# Patient Record
Sex: FEMALE | Race: WHITE | NOT HISPANIC OR LATINO | URBAN - METROPOLITAN AREA
[De-identification: names, ages, dates, MRNs, and addresses within clinical notes are randomized per-mention and may not be internally consistent; named-entity substitution may affect disease eponyms.]

---

## 2017-03-27 ENCOUNTER — IMPORTED ENCOUNTER (OUTPATIENT)
Dept: URBAN - METROPOLITAN AREA CLINIC 31 | Facility: CLINIC | Age: 62
End: 2017-03-27

## 2017-03-27 PROBLEM — H16.223: Noted: 2017-03-27

## 2017-03-27 PROBLEM — H17.89: Noted: 2017-03-27

## 2017-03-27 PROCEDURE — 92015 DETERMINE REFRACTIVE STATE: CPT

## 2017-03-27 PROCEDURE — 92014 COMPRE OPH EXAM EST PT 1/>: CPT

## 2017-03-27 NOTE — PATIENT DISCUSSION
1.  S/P  Lasik:  2000 Dist ou. 2.  Dry Eye OU:  Continue current management with Restasis bid and increase tears liquidgel to qid. Shamika [place] but will wait for now. Pt has Bacitracin leighton.  3.  Ocular rosacea- disc doxy but will hold off on it. . 4. MARLENE-   Epithelioma nasally- stable 5.   RTN 3/18  CE

## 2017-10-27 ENCOUNTER — APPOINTMENT (RX ONLY)
Dept: URBAN - METROPOLITAN AREA CLINIC 126 | Facility: CLINIC | Age: 62
Setting detail: DERMATOLOGY
End: 2017-10-27

## 2017-10-27 DIAGNOSIS — L81.4 OTHER MELANIN HYPERPIGMENTATION: ICD-10-CM

## 2017-10-27 DIAGNOSIS — Z71.89 OTHER SPECIFIED COUNSELING: ICD-10-CM

## 2017-10-27 DIAGNOSIS — L21.8 OTHER SEBORRHEIC DERMATITIS: ICD-10-CM

## 2017-10-27 DIAGNOSIS — L57.0 ACTINIC KERATOSIS: ICD-10-CM

## 2017-10-27 PROCEDURE — ? COUNSELING

## 2017-10-27 PROCEDURE — 17000 DESTRUCT PREMALG LESION: CPT

## 2017-10-27 PROCEDURE — ? TREATMENT REGIMEN

## 2017-10-27 PROCEDURE — ? LIQUID NITROGEN

## 2017-10-27 PROCEDURE — 99214 OFFICE O/P EST MOD 30 MIN: CPT | Mod: 25

## 2017-10-27 ASSESSMENT — LOCATION ZONE DERM
LOCATION ZONE: ARM
LOCATION ZONE: SCALP
LOCATION ZONE: TRUNK

## 2017-10-27 ASSESSMENT — LOCATION SIMPLE DESCRIPTION DERM
LOCATION SIMPLE: CHEST
LOCATION SIMPLE: UPPER BACK
LOCATION SIMPLE: LEFT FOREARM
LOCATION SIMPLE: HAIR
LOCATION SIMPLE: RIGHT FOREARM

## 2017-10-27 ASSESSMENT — LOCATION DETAILED DESCRIPTION DERM
LOCATION DETAILED: HAIR
LOCATION DETAILED: RIGHT DISTAL DORSAL FOREARM
LOCATION DETAILED: LOWER STERNUM
LOCATION DETAILED: LEFT PROXIMAL RADIAL DORSAL FOREARM
LOCATION DETAILED: RIGHT PROXIMAL DORSAL FOREARM
LOCATION DETAILED: INFERIOR THORACIC SPINE

## 2017-10-27 NOTE — PROCEDURE: LIQUID NITROGEN
Consent: The patient's consent was obtained including but not limited to risks of crusting, scabbing, blistering, scarring, darker or lighter pigmentary change, recurrence, incomplete removal and infection.
Post-Care Instructions: I reviewed with the patient in detail post-care instructions. Patient is to wear sunprotection, and avoid picking at any of the treated lesions. Pt may apply Vaseline to crusted or scabbing areas.
Render Post-Care Instructions In Note?: no
Detail Level: Simple
Duration Of Freeze Thaw-Cycle (Seconds): 0

## 2017-10-27 NOTE — PROCEDURE: MIPS QUALITY
Quality 47: Advance Care Plan: Advance Care Planning discussed and documented in the medical record; patient did not wish or was not able to name a surrogate decision maker or provide an advance care plan.
Quality 131: Pain Assessment And Follow-Up: Pain assessment using a standardized tool is documented as negative, no follow-up plan required
Quality 130: Documentation Of Current Medications In The Medical Record: Current Medications Documented
Quality 111:Pneumonia Vaccination Status For Older Adults: Pneumococcal Vaccination Previously Received
Quality 110: Preventive Care And Screening: Influenza Immunization: Influenza Immunization previously received during influenza season
Quality 226: Preventive Care And Screening: Tobacco Use: Screening And Cessation Intervention: Patient screened for tobacco and never smoked
Detail Level: Detailed

## 2018-03-19 ENCOUNTER — IMPORTED ENCOUNTER (OUTPATIENT)
Dept: URBAN - METROPOLITAN AREA CLINIC 31 | Facility: CLINIC | Age: 63
End: 2018-03-19

## 2018-03-19 PROBLEM — H16.223: Noted: 2018-03-19

## 2018-03-19 PROBLEM — H17.89: Noted: 2018-03-19

## 2018-03-19 PROCEDURE — 92014 COMPRE OPH EXAM EST PT 1/>: CPT

## 2018-03-19 PROCEDURE — 92015 DETERMINE REFRACTIVE STATE: CPT

## 2018-03-19 NOTE — PATIENT DISCUSSION
1.  S/P  Lasik:  2000 Dist ou. 2.  Dry Eye OU:  Continue current management with Restasis bid and increase tears liquidgel to qid. Jessica [place] but will wait for now. Pt has Bacitracin leighton. Use FML 1x per month. 3.  Ocular rosacea- disc doxy but will hold off on it. . 4. MARLENE-   Epithelioma nasally- stable 5. Disc rx for dist-  will wait for now but pt did not do well on chart today. Will increase her OTC to 1.756.   RTN 3/19  CE

## 2019-03-20 ENCOUNTER — IMPORTED ENCOUNTER (OUTPATIENT)
Dept: URBAN - METROPOLITAN AREA CLINIC 31 | Facility: CLINIC | Age: 64
End: 2019-03-20

## 2019-03-20 PROBLEM — H16.223: Noted: 2019-03-20

## 2019-03-20 PROBLEM — H17.89: Noted: 2019-03-20

## 2019-03-20 PROCEDURE — 92014 COMPRE OPH EXAM EST PT 1/>: CPT

## 2019-03-20 PROCEDURE — 92015 DETERMINE REFRACTIVE STATE: CPT

## 2019-03-20 NOTE — PATIENT DISCUSSION
1.  S/P  Lasik:  2000 Dist ou. 2.  Dry Eye OU:  Continue Restasis bid and increase tears liquidgel to qid. Jessica [place] but will wait for now. Pt has Bacitracin leighton. Use FML 1x per month. 3.  Ocular rosacea- disc doxy but will hold off on it. . 4. MARLENE-   Epithelioma nasally- stable 5. Disc rx for dist-  Put in suns. Will increase her OTC to 2.006.   RTN 3/20  CE

## 2019-05-24 ENCOUNTER — IMPORTED ENCOUNTER (OUTPATIENT)
Dept: URBAN - METROPOLITAN AREA CLINIC 31 | Facility: CLINIC | Age: 64
End: 2019-05-24

## 2019-05-24 PROBLEM — H16.223: Noted: 2019-05-24

## 2019-05-24 PROBLEM — H10.502: Noted: 2019-05-24

## 2019-05-24 PROBLEM — H17.89: Noted: 2019-05-24

## 2019-05-24 PROCEDURE — 99214 OFFICE O/P EST MOD 30 MIN: CPT

## 2019-05-24 NOTE — PATIENT DISCUSSION
Blepharitis posterior type - The patient exhibits inspissated meibomian glands. Warm compresses lid massage and lid scrubs were recommended.  Antibiotic leighton oral Doxycycline 50mg qd omega three/flaxseed oil supplements

## 2019-05-24 NOTE — PATIENT DISCUSSION
1.  S/P  Lasik:  2000 Dist ou. 2.  Dry Eye OU:  Continue Restasis bid and increase tears liquid gel to qid. Rx LOtemax tid and wash lids bid. Cont Bacitracin leighton qhs. Uses FML 1x per month. 3.  Ocular rosacea- disc doxy but will hold off on it. . 4. MARLENE-   Epithelioma nasally- stable 5. Disc rx for dist-  Put in suns. Will increase her OTC to 2.006. RTN  10 days OC7.   RTN 3/20  CE

## 2019-06-07 ENCOUNTER — IMPORTED ENCOUNTER (OUTPATIENT)
Dept: URBAN - METROPOLITAN AREA CLINIC 31 | Facility: CLINIC | Age: 64
End: 2019-06-07

## 2019-06-07 PROBLEM — H16.223: Noted: 2019-06-07

## 2019-06-07 PROBLEM — H17.89: Noted: 2019-06-07

## 2019-06-07 PROCEDURE — 99213 OFFICE O/P EST LOW 20 MIN: CPT

## 2019-06-07 NOTE — PATIENT DISCUSSION
1.  S/P  Lasik:  2000 Dist ou. 2.  Dry Eye OU:  Continue Restasis bid and increase tears liquid gel to qid. Finish Lotemax qd and wash lids bid. Cont Bacitracin leighton qhs. Uses FML 1x per month. 3.  Ocular rosacea- disc doxy but will hold off on it. . 4. MARLENE-   Epithelioma nasally- stable 5. Disc rx for dist-  Put in suns. Will increase her OTC to 2.006.    RTN 3/20  CE

## 2019-08-30 ENCOUNTER — APPOINTMENT (RX ONLY)
Dept: URBAN - METROPOLITAN AREA CLINIC 126 | Facility: CLINIC | Age: 64
Setting detail: DERMATOLOGY
End: 2019-08-30

## 2019-08-30 DIAGNOSIS — L81.4 OTHER MELANIN HYPERPIGMENTATION: ICD-10-CM

## 2019-08-30 DIAGNOSIS — D22 MELANOCYTIC NEVI: ICD-10-CM

## 2019-08-30 DIAGNOSIS — L81.5 LEUKODERMA, NOT ELSEWHERE CLASSIFIED: ICD-10-CM

## 2019-08-30 DIAGNOSIS — D69.2 OTHER NONTHROMBOCYTOPENIC PURPURA: ICD-10-CM

## 2019-08-30 DIAGNOSIS — D18.0 HEMANGIOMA: ICD-10-CM

## 2019-08-30 DIAGNOSIS — Z71.89 OTHER SPECIFIED COUNSELING: ICD-10-CM

## 2019-08-30 PROBLEM — D22.5 MELANOCYTIC NEVI OF TRUNK: Status: ACTIVE | Noted: 2019-08-30

## 2019-08-30 PROBLEM — D18.01 HEMANGIOMA OF SKIN AND SUBCUTANEOUS TISSUE: Status: ACTIVE | Noted: 2019-08-30

## 2019-08-30 PROCEDURE — ? TREATMENT REGIMEN

## 2019-08-30 PROCEDURE — 99214 OFFICE O/P EST MOD 30 MIN: CPT

## 2019-08-30 PROCEDURE — ? COUNSELING

## 2019-08-30 ASSESSMENT — LOCATION ZONE DERM
LOCATION ZONE: LEG
LOCATION ZONE: ARM
LOCATION ZONE: TRUNK

## 2019-08-30 ASSESSMENT — LOCATION DETAILED DESCRIPTION DERM
LOCATION DETAILED: EPIGASTRIC SKIN
LOCATION DETAILED: INFERIOR THORACIC SPINE
LOCATION DETAILED: LOWER STERNUM
LOCATION DETAILED: RIGHT MEDIAL UPPER BACK
LOCATION DETAILED: RIGHT PROXIMAL PRETIBIAL REGION
LOCATION DETAILED: LEFT PROXIMAL RADIAL DORSAL FOREARM
LOCATION DETAILED: RIGHT PROXIMAL DORSAL FOREARM
LOCATION DETAILED: SUBXIPHOID
LOCATION DETAILED: LEFT PROXIMAL PRETIBIAL REGION
LOCATION DETAILED: LEFT PROXIMAL DORSAL FOREARM

## 2019-08-30 ASSESSMENT — LOCATION SIMPLE DESCRIPTION DERM
LOCATION SIMPLE: LEFT PRETIBIAL REGION
LOCATION SIMPLE: LEFT FOREARM
LOCATION SIMPLE: UPPER BACK
LOCATION SIMPLE: RIGHT UPPER BACK
LOCATION SIMPLE: CHEST
LOCATION SIMPLE: RIGHT FOREARM
LOCATION SIMPLE: ABDOMEN
LOCATION SIMPLE: RIGHT PRETIBIAL REGION

## 2019-08-30 NOTE — PROCEDURE: MIPS QUALITY
Detail Level: Simple
Additional Notes: Pain 0/10, shingrix denied
Quality 131: Pain Assessment And Follow-Up: Pain assessment using a standardized tool is documented as negative, no follow-up plan required
Quality 130: Documentation Of Current Medications In The Medical Record: Current Medications Documented

## 2019-10-08 ENCOUNTER — APPOINTMENT (RX ONLY)
Dept: URBAN - METROPOLITAN AREA CLINIC 126 | Facility: CLINIC | Age: 64
Setting detail: DERMATOLOGY
End: 2019-10-08

## 2019-10-08 DIAGNOSIS — L0391 CELLULITIS AND ABSCESS OF UNSPECIFIED SITES: ICD-10-CM

## 2019-10-08 DIAGNOSIS — L0390 CELLULITIS AND ABSCESS OF UNSPECIFIED SITES: ICD-10-CM

## 2019-10-08 PROBLEM — L02.01 CUTANEOUS ABSCESS OF FACE: Status: ACTIVE | Noted: 2019-10-08

## 2019-10-08 PROCEDURE — 99213 OFFICE O/P EST LOW 20 MIN: CPT

## 2019-10-08 PROCEDURE — ? ORDER TESTS

## 2019-10-08 PROCEDURE — ? PRESCRIPTION

## 2019-10-08 RX ORDER — KETOCONAZOLE 20 MG/G
CREAM TOPICAL
Qty: 1 | Refills: 2 | Status: ERX

## 2019-10-08 ASSESSMENT — LOCATION SIMPLE DESCRIPTION DERM: LOCATION SIMPLE: LEFT LIP

## 2019-10-08 ASSESSMENT — LOCATION ZONE DERM: LOCATION ZONE: LIP

## 2019-10-08 ASSESSMENT — LOCATION DETAILED DESCRIPTION DERM: LOCATION DETAILED: LEFT ORAL COMMISSURE

## 2019-10-08 NOTE — PROCEDURE: ORDER TESTS
Performing Laboratory: [id number]
Bill For Surgical Tray: no
Billing Type: [employer]
Expected Date Of Service: 10/08/2019

## 2020-08-18 ENCOUNTER — IMPORTED ENCOUNTER (OUTPATIENT)
Dept: URBAN - METROPOLITAN AREA CLINIC 31 | Facility: CLINIC | Age: 65
End: 2020-08-18

## 2020-08-18 PROBLEM — H16.223: Noted: 2020-08-18

## 2020-08-18 PROBLEM — H25.13: Noted: 2020-08-18

## 2020-08-18 PROBLEM — H17.89: Noted: 2020-08-18

## 2020-08-18 PROCEDURE — 92014 COMPRE OPH EXAM EST PT 1/>: CPT

## 2020-08-18 PROCEDURE — 92015 DETERMINE REFRACTIVE STATE: CPT

## 2020-08-18 NOTE — PATIENT DISCUSSION
1. Early  Nuclear Sclerotic Cataract OU: Explained how cataracts can effect vision. Recommend clinical observation. The patient was advised to contact us if any change or worsening of vision. 2. S/P  Lasik:  2000 Dist ou. 2.  Dry Eye OU:  Continue Restasis bid and increase tears liquid gel to qid. wash lids bid. Cont Bacitracin leighton qhs. Uses FML 1x per month. 3.  Ocular rosacea- disc doxy but will hold off on it. . 4. MARLENE-   Epithelioma nasally- stable 5. Disc rx for dist-  Put in suns. Will increase her OTC to 2.006.    RTN  8/21  CE

## 2020-08-18 NOTE — PATIENT DISCUSSION
1.  S/P  Lasik:  2000 Dist ou. 2.  Dry Eye OU:  Continue Restasis bid and increase tears liquid gel to qid. wash lids bid. Cont Bacitracin leighton qhs. Uses FML 1x per month. 3.  Ocular rosacea- disc doxy but will hold off on it. . 4. MARLENE-   Epithelioma nasally- stable 5. Disc rx for dist-  Put in suns. Will increase her OTC to 2.006.    RTN  8/21  CE

## 2020-10-09 ENCOUNTER — APPOINTMENT (RX ONLY)
Dept: URBAN - METROPOLITAN AREA CLINIC 126 | Facility: CLINIC | Age: 65
Setting detail: DERMATOLOGY
End: 2020-10-09

## 2020-10-09 DIAGNOSIS — Z71.89 OTHER SPECIFIED COUNSELING: ICD-10-CM

## 2020-10-09 DIAGNOSIS — L81.4 OTHER MELANIN HYPERPIGMENTATION: ICD-10-CM

## 2020-10-09 DIAGNOSIS — L81.5 LEUKODERMA, NOT ELSEWHERE CLASSIFIED: ICD-10-CM

## 2020-10-09 DIAGNOSIS — L71.0 PERIORAL DERMATITIS: ICD-10-CM

## 2020-10-09 DIAGNOSIS — D22 MELANOCYTIC NEVI: ICD-10-CM

## 2020-10-09 DIAGNOSIS — L21.8 OTHER SEBORRHEIC DERMATITIS: ICD-10-CM

## 2020-10-09 PROBLEM — D22.5 MELANOCYTIC NEVI OF TRUNK: Status: ACTIVE | Noted: 2020-10-09

## 2020-10-09 PROBLEM — D48.5 NEOPLASM OF UNCERTAIN BEHAVIOR OF SKIN: Status: ACTIVE | Noted: 2020-10-09

## 2020-10-09 PROCEDURE — ? TREATMENT REGIMEN

## 2020-10-09 PROCEDURE — ? COUNSELING

## 2020-10-09 PROCEDURE — 99214 OFFICE O/P EST MOD 30 MIN: CPT

## 2020-10-09 ASSESSMENT — LOCATION DETAILED DESCRIPTION DERM
LOCATION DETAILED: POSTERIOR MID-PARIETAL SCALP
LOCATION DETAILED: RIGHT PROXIMAL DORSAL FOREARM
LOCATION DETAILED: RIGHT DISTAL DORSAL FOREARM
LOCATION DETAILED: LEFT PROXIMAL POSTERIOR UPPER ARM
LOCATION DETAILED: RIGHT PROXIMAL POSTERIOR UPPER ARM
LOCATION DETAILED: RIGHT PROXIMAL PRETIBIAL REGION
LOCATION DETAILED: PERIUMBILICAL SKIN
LOCATION DETAILED: LEFT PROXIMAL DORSAL FOREARM
LOCATION DETAILED: LEFT DISTAL PRETIBIAL REGION
LOCATION DETAILED: SUPERIOR THORACIC SPINE
LOCATION DETAILED: EPIGASTRIC SKIN
LOCATION DETAILED: RIGHT INFERIOR MEDIAL MALAR CHEEK
LOCATION DETAILED: LEFT INFERIOR MEDIAL MALAR CHEEK

## 2020-10-09 ASSESSMENT — LOCATION SIMPLE DESCRIPTION DERM
LOCATION SIMPLE: RIGHT POSTERIOR UPPER ARM
LOCATION SIMPLE: UPPER BACK
LOCATION SIMPLE: LEFT POSTERIOR UPPER ARM
LOCATION SIMPLE: LEFT PRETIBIAL REGION
LOCATION SIMPLE: RIGHT PRETIBIAL REGION
LOCATION SIMPLE: LEFT FOREARM
LOCATION SIMPLE: LEFT CHEEK
LOCATION SIMPLE: POSTERIOR SCALP
LOCATION SIMPLE: RIGHT CHEEK
LOCATION SIMPLE: ABDOMEN
LOCATION SIMPLE: RIGHT FOREARM

## 2020-10-09 ASSESSMENT — LOCATION ZONE DERM
LOCATION ZONE: TRUNK
LOCATION ZONE: FACE
LOCATION ZONE: SCALP
LOCATION ZONE: ARM
LOCATION ZONE: LEG

## 2020-10-09 NOTE — PROCEDURE: MIPS QUALITY
Quality 130: Documentation Of Current Medications In The Medical Record: Eligible clinician attests to documenting in the medical record the patient is not eligible for a current list of medications being obtained, updated, or reviewed by the eligible clinician
Detail Level: Simple
Additional Notes: Patient unaware of medication doses

## 2020-11-03 ENCOUNTER — APPOINTMENT (RX ONLY)
Dept: URBAN - METROPOLITAN AREA CLINIC 126 | Facility: CLINIC | Age: 65
Setting detail: DERMATOLOGY
End: 2020-11-03

## 2020-11-03 DIAGNOSIS — L20.89 OTHER ATOPIC DERMATITIS: ICD-10-CM

## 2020-11-03 DIAGNOSIS — L71.8 OTHER ROSACEA: ICD-10-CM

## 2020-11-03 DIAGNOSIS — L82.1 OTHER SEBORRHEIC KERATOSIS: ICD-10-CM

## 2020-11-03 DIAGNOSIS — Z71.89 OTHER SPECIFIED COUNSELING: ICD-10-CM

## 2020-11-03 DIAGNOSIS — L71.0 PERIORAL DERMATITIS: ICD-10-CM

## 2020-11-03 PROBLEM — L20.84 INTRINSIC (ALLERGIC) ECZEMA: Status: ACTIVE | Noted: 2020-11-03

## 2020-11-03 PROCEDURE — ? COUNSELING

## 2020-11-03 PROCEDURE — ? DIAGNOSIS COMMENT

## 2020-11-03 PROCEDURE — ? PRESCRIPTION

## 2020-11-03 PROCEDURE — 99213 OFFICE O/P EST LOW 20 MIN: CPT

## 2020-11-03 RX ORDER — BETAMETHASONE DIPROPIONATE 0.5 MG/G
SPRAY TOPICAL
Qty: 120 | Refills: 0 | Status: ERX

## 2020-11-03 ASSESSMENT — LOCATION DETAILED DESCRIPTION DERM
LOCATION DETAILED: RIGHT MEDIAL TRAPEZIAL NECK
LOCATION DETAILED: LEFT UPPER CUTANEOUS LIP
LOCATION DETAILED: LEFT INFERIOR MEDIAL MALAR CHEEK

## 2020-11-03 ASSESSMENT — LOCATION ZONE DERM
LOCATION ZONE: NECK
LOCATION ZONE: LIP
LOCATION ZONE: FACE

## 2020-11-03 ASSESSMENT — LOCATION SIMPLE DESCRIPTION DERM
LOCATION SIMPLE: POSTERIOR NECK
LOCATION SIMPLE: LEFT LIP
LOCATION SIMPLE: LEFT CHEEK

## 2020-11-03 NOTE — PROCEDURE: DIAGNOSIS COMMENT
Detail Level: Zone
Comment: Gave samples of zilxi to be applied qd as well for the rosacea
Comment: Gave samples of zilxi to be applied qd as well for the perioral dermatitis

## 2020-11-03 NOTE — HPI: SKIN LESION
What Type Of Note Output Would You Prefer (Optional)?: Bullet Format
How Severe Is Your Skin Lesion?: mild
Has Your Skin Lesion Been Treated?: not been treated
Is This A New Presentation, Or A Follow-Up?: Skin Lesions
Additional History: Pt states got insect bites 3 weeks ago and not applying anything to it. Pt states tried Benadryl cream and alcohol to it with no relief.  Pt states itchy

## 2020-11-06 RX ORDER — BETAMETHASONE DIPROPIONATE 0.5 MG/G
SPRAY TOPICAL
Qty: 120 | Refills: 0 | Status: ERX

## 2021-09-03 ENCOUNTER — IMPORTED ENCOUNTER (OUTPATIENT)
Dept: URBAN - METROPOLITAN AREA CLINIC 31 | Facility: CLINIC | Age: 66
End: 2021-09-03

## 2021-09-03 PROBLEM — H17.89: Noted: 2021-09-03

## 2021-09-03 PROBLEM — H16.223: Noted: 2021-09-03

## 2021-09-03 PROBLEM — H25.13: Noted: 2021-09-03

## 2021-09-03 PROCEDURE — 92014 COMPRE OPH EXAM EST PT 1/>: CPT

## 2021-09-03 PROCEDURE — 92015 DETERMINE REFRACTIVE STATE: CPT

## 2021-09-03 NOTE — PATIENT DISCUSSION
1. Early  Nuclear Sclerotic Cataract OU:   Explained how cataracts can effect vision. Recommend clinical observation. The patient was advised to contact us if any change or worsening of vision. 2. S/P  Lasik:  2000 Dist ou. 2.  Dry Eye OU:  Continue Restasis bid and increase tears liquid gel to qid. wash lids bid. Cont Bacitracin leighton qhs. Uses FML 1x per month. 3.  Ocular rosacea- disc doxy but will hold off on it. . 4. MARLENE-   Epithelioma nasally- stable 5. NO rx for dist-  Put in suns. Using 1.50 and fine6.    RTN  8/22  CE

## 2021-10-15 ENCOUNTER — APPOINTMENT (RX ONLY)
Dept: URBAN - METROPOLITAN AREA CLINIC 126 | Facility: CLINIC | Age: 66
Setting detail: DERMATOLOGY
End: 2021-10-15

## 2021-10-15 DIAGNOSIS — Z71.89 OTHER SPECIFIED COUNSELING: ICD-10-CM

## 2021-10-15 DIAGNOSIS — L81.4 OTHER MELANIN HYPERPIGMENTATION: ICD-10-CM

## 2021-10-15 DIAGNOSIS — D22 MELANOCYTIC NEVI: ICD-10-CM

## 2021-10-15 DIAGNOSIS — L81.5 LEUKODERMA, NOT ELSEWHERE CLASSIFIED: ICD-10-CM

## 2021-10-15 DIAGNOSIS — D18.0 HEMANGIOMA: ICD-10-CM

## 2021-10-15 PROBLEM — D18.01 HEMANGIOMA OF SKIN AND SUBCUTANEOUS TISSUE: Status: ACTIVE | Noted: 2021-10-15

## 2021-10-15 PROBLEM — D22.5 MELANOCYTIC NEVI OF TRUNK: Status: ACTIVE | Noted: 2021-10-15

## 2021-10-15 PROCEDURE — ? SUNSCREEN RECOMMENDATIONS

## 2021-10-15 PROCEDURE — ? COUNSELING

## 2021-10-15 PROCEDURE — 99213 OFFICE O/P EST LOW 20 MIN: CPT

## 2021-10-15 ASSESSMENT — LOCATION DETAILED DESCRIPTION DERM
LOCATION DETAILED: RIGHT MEDIAL UPPER BACK
LOCATION DETAILED: RIGHT MEDIAL BREAST 3-4:00 REGION
LOCATION DETAILED: LEFT PROXIMAL DORSAL FOREARM
LOCATION DETAILED: RIGHT PROXIMAL DORSAL FOREARM
LOCATION DETAILED: EPIGASTRIC SKIN
LOCATION DETAILED: RIGHT PROXIMAL POSTERIOR UPPER ARM
LOCATION DETAILED: LEFT PROXIMAL POSTERIOR UPPER ARM
LOCATION DETAILED: SUPERIOR THORACIC SPINE
LOCATION DETAILED: PERIUMBILICAL SKIN
LOCATION DETAILED: RIGHT PROXIMAL PRETIBIAL REGION
LOCATION DETAILED: LEFT DISTAL PRETIBIAL REGION

## 2021-10-15 ASSESSMENT — LOCATION SIMPLE DESCRIPTION DERM
LOCATION SIMPLE: RIGHT UPPER BACK
LOCATION SIMPLE: RIGHT BREAST
LOCATION SIMPLE: LEFT FOREARM
LOCATION SIMPLE: LEFT POSTERIOR UPPER ARM
LOCATION SIMPLE: UPPER BACK
LOCATION SIMPLE: RIGHT FOREARM
LOCATION SIMPLE: RIGHT PRETIBIAL REGION
LOCATION SIMPLE: ABDOMEN
LOCATION SIMPLE: RIGHT POSTERIOR UPPER ARM
LOCATION SIMPLE: LEFT PRETIBIAL REGION

## 2021-10-15 ASSESSMENT — LOCATION ZONE DERM
LOCATION ZONE: LEG
LOCATION ZONE: ARM
LOCATION ZONE: TRUNK

## 2022-04-02 ASSESSMENT — VISUAL ACUITY
OS_CC: 20/30
OS_CC: 20/40-1
OD_CC: 20/30+2
OD_CC: 20/50-1
OS_SC: 20/200
OD_PH: SC 20/25 -1
OS_CC: 20/40
OD_SC: 20/200
OD_CC: 20/60
OD_CC: 20/30-2
OS_CC: 20/60+3
OD_CC: 20/30-1
OS_CC: 20/50-1
OS_PH: 20/30
OS_PH: SC 20/25
OD_PH: SC 20/30 +3
OD_CC: 20/70
OS_CC: 20/50-1
OD_CC: 20/40
OS_CC: 20/30-2

## 2022-04-02 ASSESSMENT — TONOMETRY
OS_IOP_MMHG: 17
OS_IOP_MMHG: 19
OD_IOP_MMHG: 17
OD_IOP_MMHG: 16
OD_IOP_MMHG: 16
OS_IOP_MMHG: 16
OS_IOP_MMHG: 16
OD_IOP_MMHG: 16
OS_IOP_MMHG: 16
OS_IOP_MMHG: 17
OD_IOP_MMHG: 16
OD_IOP_MMHG: 16

## 2022-06-04 ENCOUNTER — TELEPHONE ENCOUNTER (OUTPATIENT)
Dept: URBAN - METROPOLITAN AREA CLINIC 68 | Facility: CLINIC | Age: 67
End: 2022-06-04

## 2022-06-05 ENCOUNTER — TELEPHONE ENCOUNTER (OUTPATIENT)
Dept: URBAN - METROPOLITAN AREA CLINIC 68 | Facility: CLINIC | Age: 67
End: 2022-06-05

## 2022-06-05 RX ORDER — MONTELUKAST SODIUM 10 MG/1
SINGULAIR( 10MG ORAL  DAILY ) ACTIVE -HX ENTRY TABLET, FILM COATED ORAL DAILY
Status: ACTIVE | COMMUNITY
Start: 2015-10-14

## 2022-06-05 RX ORDER — DICYCLOMINE HYDROCHLORIDE 20 MG/1
TABLET ORAL
Qty: 180 | Refills: 180 | Status: ACTIVE | COMMUNITY
Start: 2015-10-14

## 2022-06-05 RX ORDER — LEVOTHYROXINE SODIUM 175 UG/1
TABLET ORAL AS DIRECTED
Status: ACTIVE | COMMUNITY
Start: 2007-01-02

## 2022-06-05 RX ORDER — LISINOPRIL 20 MG/1
LISINOPRIL( 20MG ORAL  DAILY ) ACTIVE -HX ENTRY TABLET ORAL DAILY
Status: ACTIVE | COMMUNITY
Start: 2015-10-14

## 2022-06-05 RX ORDER — ESTRADIOL 0.1 MG/G
ESTRACE( 0.1MG/GM VAGINAL  WEEKLY ) ACTIVE -HX ENTRY CREAM VAGINAL WEEKLY
Status: ACTIVE | COMMUNITY
Start: 2015-10-14

## 2022-06-05 RX ORDER — LEVOTHYROXINE SODIUM 125 UG/1
SYNTHROID( 125MCG ORAL  5 TIMES A WEEK ) ACTIVE -HX ENTRY TABLET ORAL
Status: ACTIVE | COMMUNITY
Start: 2015-10-14

## 2022-06-05 RX ORDER — LEVOTHYROXINE SODIUM 112 UG/1
SYNTHROID( 112MCG ORAL  2 TIMES A WEEK ) ACTIVE -HX ENTRY TABLET ORAL
Status: ACTIVE | COMMUNITY
Start: 2015-10-14

## 2022-06-25 ENCOUNTER — TELEPHONE ENCOUNTER (OUTPATIENT)
Age: 67
End: 2022-06-25

## 2022-06-26 ENCOUNTER — TELEPHONE ENCOUNTER (OUTPATIENT)
Age: 67
End: 2022-06-26

## 2022-06-26 RX ORDER — MONTELUKAST SODIUM 10 MG/1
SINGULAIR( 10MG ORAL  DAILY ) ACTIVE -HX ENTRY TABLET, FILM COATED ORAL DAILY
Status: ACTIVE | COMMUNITY
Start: 2015-10-14

## 2022-06-26 RX ORDER — LEVOTHYROXINE SODIUM 175 MCG
TABLET ORAL AS DIRECTED
Status: ACTIVE | COMMUNITY
Start: 2007-01-02

## 2022-06-26 RX ORDER — LEVOTHYROXINE SODIUM 112 MCG
SYNTHROID( 112MCG ORAL  2 TIMES A WEEK ) ACTIVE -HX ENTRY TABLET ORAL
Status: ACTIVE | COMMUNITY
Start: 2015-10-14

## 2022-06-26 RX ORDER — ESTRADIOL 0.1 MG/G
ESTRACE( 0.1MG/GM VAGINAL  WEEKLY ) ACTIVE -HX ENTRY CREAM VAGINAL WEEKLY
Status: ACTIVE | COMMUNITY
Start: 2015-10-14

## 2022-06-26 RX ORDER — DICYCLOMINE HYDROCHLORIDE 20 MG/1
TABLET ORAL
Qty: 180 | Refills: 180 | Status: ACTIVE | COMMUNITY
Start: 2015-10-14

## 2022-06-26 RX ORDER — LISINOPRIL 20 MG/1
LISINOPRIL( 20MG ORAL  DAILY ) ACTIVE -HX ENTRY TABLET ORAL DAILY
Status: ACTIVE | COMMUNITY
Start: 2015-10-14

## 2022-06-26 RX ORDER — LEVOTHYROXINE SODIUM 125 MCG
SYNTHROID( 125MCG ORAL  5 TIMES A WEEK ) ACTIVE -HX ENTRY TABLET ORAL
Status: ACTIVE | COMMUNITY
Start: 2015-10-14

## 2022-06-26 RX ORDER — CETIRIZINE HYDROCHLORIDE 10 MG/1
ZYRTEC(    1 TABLET DAILY ) ACTIVE -HX ENTRY TABLET, FILM COATED ORAL
Status: ACTIVE | COMMUNITY
Start: 2007-01-02

## 2022-06-26 RX ORDER — LEVOTHYROXINE SODIUM 100 MCG
SYNTHROID(    AS DIRECTED ) ACTIVE -HX ENTRY TABLET ORAL AS DIRECTED
Status: ACTIVE | COMMUNITY
Start: 2007-01-02

## 2022-09-02 ENCOUNTER — COMPREHENSIVE EXAM (OUTPATIENT)
Dept: URBAN - METROPOLITAN AREA CLINIC 34 | Facility: CLINIC | Age: 67
End: 2022-09-02

## 2022-09-02 DIAGNOSIS — H25.13: ICD-10-CM

## 2022-09-02 DIAGNOSIS — H04.123: ICD-10-CM

## 2022-09-02 PROCEDURE — 92015 DETERMINE REFRACTIVE STATE: CPT

## 2022-09-02 PROCEDURE — 92014 COMPRE OPH EXAM EST PT 1/>: CPT

## 2022-09-02 ASSESSMENT — VISUAL ACUITY
OD_SC: 20/40
OS_SC: 20/40
OD_SC: 20/50
OS_SC: 20/50

## 2022-09-02 ASSESSMENT — TONOMETRY
OD_IOP_MMHG: 16
OS_IOP_MMHG: 16

## 2022-09-02 NOTE — PATIENT DISCUSSION
Continue Restasis bid and increase tears liquid gel to qid. wash lids bid. Cont Bacitracin leighton qhs. Uses FML 1x per month.

## 2023-09-15 ENCOUNTER — COMPREHENSIVE EXAM (OUTPATIENT)
Dept: URBAN - METROPOLITAN AREA CLINIC 34 | Facility: CLINIC | Age: 68
End: 2023-09-15

## 2023-09-15 DIAGNOSIS — H04.123: ICD-10-CM

## 2023-09-15 DIAGNOSIS — H16.223: ICD-10-CM

## 2023-09-15 DIAGNOSIS — H25.13: ICD-10-CM

## 2023-09-15 PROCEDURE — 92015 DETERMINE REFRACTIVE STATE: CPT

## 2023-09-15 PROCEDURE — 92014 COMPRE OPH EXAM EST PT 1/>: CPT

## 2023-09-15 ASSESSMENT — VISUAL ACUITY
OD_SC: 20/100+2
OS_SC: 20/30+3
OS_SC: 20/40-2
OD_SC: 20/30-1
OD_PH: 20/30-2

## 2023-09-15 ASSESSMENT — TONOMETRY
OS_IOP_MMHG: 17
OD_IOP_MMHG: 17

## 2024-04-15 NOTE — PROCEDURE: TREATMENT REGIMEN
MRN [id number]. JESUS Smart
Initiate Treatment: RCD zinc shampoo
Samples Given: Head and shoulders leave on treatment
Detail Level: Simple

## 2024-09-13 ENCOUNTER — COMPREHENSIVE EXAM (OUTPATIENT)
Dept: URBAN - METROPOLITAN AREA CLINIC 34 | Facility: CLINIC | Age: 69
End: 2024-09-13

## 2024-09-13 DIAGNOSIS — H04.123: ICD-10-CM

## 2024-09-13 DIAGNOSIS — H25.13: ICD-10-CM

## 2024-09-13 DIAGNOSIS — H16.223: ICD-10-CM

## 2024-09-13 PROCEDURE — 92014 COMPRE OPH EXAM EST PT 1/>: CPT

## 2024-09-13 PROCEDURE — 92015 DETERMINE REFRACTIVE STATE: CPT
